# Patient Record
Sex: FEMALE | Race: OTHER | NOT HISPANIC OR LATINO | URBAN - METROPOLITAN AREA
[De-identification: names, ages, dates, MRNs, and addresses within clinical notes are randomized per-mention and may not be internally consistent; named-entity substitution may affect disease eponyms.]

---

## 2019-05-03 ENCOUNTER — EMERGENCY (EMERGENCY)
Facility: HOSPITAL | Age: 49
LOS: 1 days | Discharge: HOME | End: 2019-05-03
Attending: EMERGENCY MEDICINE | Admitting: EMERGENCY MEDICINE
Payer: OTHER MISCELLANEOUS

## 2019-05-03 VITALS
OXYGEN SATURATION: 100 % | RESPIRATION RATE: 18 BRPM | SYSTOLIC BLOOD PRESSURE: 135 MMHG | HEART RATE: 62 BPM | TEMPERATURE: 98 F | DIASTOLIC BLOOD PRESSURE: 67 MMHG

## 2019-05-03 VITALS
OXYGEN SATURATION: 100 % | SYSTOLIC BLOOD PRESSURE: 142 MMHG | HEART RATE: 76 BPM | RESPIRATION RATE: 18 BRPM | TEMPERATURE: 98 F | DIASTOLIC BLOOD PRESSURE: 67 MMHG

## 2019-05-03 PROCEDURE — 73030 X-RAY EXAM OF SHOULDER: CPT | Mod: 26,LT

## 2019-05-03 PROCEDURE — 70450 CT HEAD/BRAIN W/O DYE: CPT | Mod: 26

## 2019-05-03 PROCEDURE — 73552 X-RAY EXAM OF FEMUR 2/>: CPT | Mod: 26,LT

## 2019-05-03 PROCEDURE — 99284 EMERGENCY DEPT VISIT MOD MDM: CPT

## 2019-05-03 PROCEDURE — 71046 X-RAY EXAM CHEST 2 VIEWS: CPT | Mod: 26

## 2019-05-03 PROCEDURE — 72125 CT NECK SPINE W/O DYE: CPT | Mod: 26

## 2019-05-03 RX ORDER — CYCLOBENZAPRINE HYDROCHLORIDE 10 MG/1
1 TABLET, FILM COATED ORAL
Qty: 21 | Refills: 0 | OUTPATIENT
Start: 2019-05-03 | End: 2019-05-09

## 2019-05-03 RX ORDER — CYCLOBENZAPRINE HYDROCHLORIDE 10 MG/1
10 TABLET, FILM COATED ORAL ONCE
Qty: 0 | Refills: 0 | Status: COMPLETED | OUTPATIENT
Start: 2019-05-03 | End: 2019-05-03

## 2019-05-03 RX ORDER — LIDOCAINE 4 G/100G
1 CREAM TOPICAL ONCE
Qty: 0 | Refills: 0 | Status: COMPLETED | OUTPATIENT
Start: 2019-05-03 | End: 2019-05-03

## 2019-05-03 RX ORDER — ACETAMINOPHEN 500 MG
650 TABLET ORAL ONCE
Qty: 0 | Refills: 0 | Status: COMPLETED | OUTPATIENT
Start: 2019-05-03 | End: 2019-05-03

## 2019-05-03 RX ADMIN — LIDOCAINE 1 PATCH: 4 CREAM TOPICAL at 21:06

## 2019-05-03 RX ADMIN — CYCLOBENZAPRINE HYDROCHLORIDE 10 MILLIGRAM(S): 10 TABLET, FILM COATED ORAL at 21:05

## 2019-05-03 RX ADMIN — Medication 650 MILLIGRAM(S): at 19:55

## 2019-05-03 NOTE — ED ADULT NURSE NOTE - OBJECTIVE STATEMENT
48 year old female presents to the ER today with a head injury. Pt states a 40 pound light fixture fell on her head while at work today at 1pm    Pt denies LOC alert and oriented x4, MAEX4, no neuro deficits noted no contusions or abrasions noted no laceration noted pt c/o left arm pain and numbness + radial pulse noted   MD shasta young

## 2019-05-03 NOTE — ED PROVIDER NOTE - PHYSICAL EXAMINATION
PHYSICAL EXAM:  GENERAL: NAD, well-groomed, well-developed  HEAD:  Atraumatic, Normocephalic  EYES: EOMI, PERRLA, conjunctiva and sclera clear  ENMT: No tonsillar erythema, exudates, or enlargement; Moist mucous membranes  NECK: Supple, No JVD, midline cervical spine tenderness, trachea midline  HEART: Regular rate and rhythm; No murmurs, rubs, or gallops  RESPIRATORY: CTA B/L, No W/R/R  ABDOMEN: Soft, Nontender, Nondistended  NEURO: A&Ox3, nonfocal, moving all extremities, PERRL, 5/5 strength in extremities, no dysmetria, normal speech/memory   EXTREMITIES:  2+ Peripheral Pulses, No clubbing, cyanosis, or edema,  tenderness L shoulder anterior, pain with active/passive ROM,  no obvious dislocation/deformity, no axillary nerve deficit, 2+ distal pulses, normal ROM at elbow/wrist/dsitally, no skin changes, RUE normal, mild ttp to L distal femur, normal ROM and no ttp at hips/knees/ankles, no pain with lateral compression of pelvis, normal distal pulses, normal sensation   SKIN: warm, dry, normal color, no rash

## 2019-05-03 NOTE — ED PROVIDER NOTE - ATTENDING CONTRIBUTION TO CARE
Dr. Mcgee:  I have personally performed a face to face bedside history and physical examination of this patient. I have discussed the history, examination, review of systems, assessment and plan of management with the resident. I have reviewed the electronic medical record and amended it to reflect my history, review of systems, physical exam, assessment and plan.    48F denies presents with worsening headache after being hit in head with large ceiling fixture.  Also c/o neck pain and left shoulder/leg pain.  Denies N/V, cp, sob, double vision.    Exam:  - nad  - rrr  - ctab  - abd soft ntnd  - +cervical TTP  - no focal neuro deficits    A/P  - head trauma  - CT head/c-spine, cxr, xray shoulder  - pain control Dr. Mcgee:  I have personally performed a face to face bedside history and physical examination of this patient. I have discussed the history, examination, review of systems, assessment and plan of management with the resident. I have reviewed the electronic medical record and amended it to reflect my history, review of systems, physical exam, assessment and plan.    48F denies presents with worsening headache after being hit in head with large ceiling fixture.  Also c/o neck pain and left shoulder/leg pain.  Denies N/V, cp, sob, double vision.    Exam:  - nad  - rrr  - ctab  - abd soft ntnd  - +cervical TTP  - no focal neuro deficits  - +TTP left shoulder but full range of motion    A/P  - head trauma  - CT head/c-spine, cxr, xray shoulder  - pain control

## 2019-05-03 NOTE — ED ADULT TRIAGE NOTE - CHIEF COMPLAINT QUOTE
Pt. states a light fixture (approx 50lbs) fell on her head around 1pm while at work. c/o pain to the head radiating down neck into chest and left arm. Numbness and "coldness" to left arm. + pulses noted. Also c/o sob, dizziness and blurry vision.  Denies n/v. Denies any blood thinner use. FIT MD called for trauma eval. Pt. states a light fixture (approx 50lbs) fell on her head around 1pm while at work. c/o pain to the head radiating down neck into chest and left arm. Numbness and "coldness" to left arm. + pulses noted. Also c/o sob, dizziness and blurry vision.  Denies n/v, LOC or lacerations to head. Denies any blood thinner use. FIT MD called -no trauma code called

## 2019-05-03 NOTE — ED PROVIDER NOTE - NSFOLLOWUPINSTRUCTIONS_ED_ALL_ED_FT
Please follow up with your primary care physician.    Recommend Tylenol and cyclobenzaprine for pain and muscle spasms.

## 2019-05-03 NOTE — ED PROVIDER NOTE - CLINICAL SUMMARY MEDICAL DECISION MAKING FREE TEXT BOX
Martin PGY1- healthy 48 yoF, trauma at 13:00, head L side of body, large lighting fixture, no LOC, HA worsening, neck/shoulder/leg pain, ambulatory, no pain meds at home, no N/V or CP/SOB, no AC use  midline cervical ttp, normal ROM, non focal neuro exam, no signs of trauma to scalp, L shoulder ttp, L distal femur ttp, abd soft, heart rrr, lungs cta, extremities wwp, no skin changes  radiographs chest, L shoulder, L femur, ct head/c spine, upreg, tylenol  likely MSK pain from trauma, will eval for intracranial pathology, cervical trauma, or fractures

## 2019-05-03 NOTE — ED ADULT NURSE NOTE - CHIEF COMPLAINT QUOTE
Pt. states a light fixture (approx 50lbs) fell on her head around 1pm while at work. c/o pain to the head radiating down neck into chest and left arm. Numbness and "coldness" to left arm. + pulses noted. Also c/o sob, dizziness and blurry vision.  Denies n/v, LOC or lacerations to head. Denies any blood thinner use. FIT MD called -no trauma code called

## 2019-05-03 NOTE — ED PROVIDER NOTE - OBJECTIVE STATEMENT
48 yoF, healthy, anuel cortes 48 yoF, healthy, struck with large lighting fixture, approx 8 feet/50 pounds on top of head and L side of body, shoulder/leg around 13:00 today while at work. Immediately had 6/10 HA but has been worsening and is now 10/10. Ambulatory but with L distal thigh pain. Also c/o L shoulder pain/neck pain. Denies LOC. No N/V. No CP/SOB. Some blurry vision/dizziness. Denies anticoagulation or antiplatelet use. No recent illness. Did not take anything at home for pain.

## 2022-05-03 NOTE — ED ADULT NURSE NOTE - TOBACCO SCREENING (FROM THE ASSIST)
Statement Selected Eating 5 or 6 small meals instead of 3 large meals may help to reduce GERD symptoms. Also avoid fatty and fried foods, peppermint, caffeine, and any other foods that aggravate your symptoms; avoid alcohol and smoking as well. Follow a diabetic heart healthy diet -- no concentrated sweets, no fruit juices, eat mostly poultry and fish, occasional lean red meat, fresh vegetables and occasional fresh fruit, limit starchy foods such as bread, potatoes, pasta, rice.

## 2025-05-17 ENCOUNTER — EMERGENCY (EMERGENCY)
Facility: HOSPITAL | Age: 55
LOS: 0 days | Discharge: HOME | End: 2025-05-17
Payer: COMMERCIAL

## 2025-05-17 VITALS
SYSTOLIC BLOOD PRESSURE: 115 MMHG | OXYGEN SATURATION: 100 % | HEART RATE: 76 BPM | TEMPERATURE: 99 F | HEIGHT: 63 IN | RESPIRATION RATE: 18 BRPM | DIASTOLIC BLOOD PRESSURE: 73 MMHG | WEIGHT: 139.99 LBS

## 2025-05-17 DIAGNOSIS — M79.675 PAIN IN LEFT TOE(S): ICD-10-CM

## 2025-05-17 DIAGNOSIS — Z88.0 ALLERGY STATUS TO PENICILLIN: ICD-10-CM

## 2025-05-17 DIAGNOSIS — Z88.5 ALLERGY STATUS TO NARCOTIC AGENT: ICD-10-CM

## 2025-05-17 DIAGNOSIS — S90.121A CONTUSION OF RIGHT LESSER TOE(S) WITHOUT DAMAGE TO NAIL, INITIAL ENCOUNTER: ICD-10-CM
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
## 2025-05-17 PROCEDURE — 99284 EMERGENCY DEPT VISIT MOD MDM: CPT

## 2025-05-17 PROCEDURE — 73660 X-RAY EXAM OF TOE(S): CPT | Mod: 26,LT
